# Patient Record
Sex: MALE | Race: WHITE | NOT HISPANIC OR LATINO | Employment: FULL TIME | URBAN - NONMETROPOLITAN AREA
[De-identification: names, ages, dates, MRNs, and addresses within clinical notes are randomized per-mention and may not be internally consistent; named-entity substitution may affect disease eponyms.]

---

## 2024-04-03 ENCOUNTER — OFFICE VISIT (OUTPATIENT)
Dept: ENDOCRINOLOGY | Facility: CLINIC | Age: 54
End: 2024-04-03
Payer: COMMERCIAL

## 2024-04-03 VITALS
SYSTOLIC BLOOD PRESSURE: 142 MMHG | DIASTOLIC BLOOD PRESSURE: 80 MMHG | HEART RATE: 88 BPM | HEIGHT: 69 IN | BODY MASS INDEX: 25.48 KG/M2 | WEIGHT: 172 LBS

## 2024-04-03 DIAGNOSIS — E11.8 TYPE 2 DIABETES MELLITUS WITH COMPLICATIONS: Primary | ICD-10-CM

## 2024-04-03 DIAGNOSIS — E53.8 VITAMIN B12 DEFICIENCY: ICD-10-CM

## 2024-04-03 DIAGNOSIS — E78.2 MIXED HYPERLIPIDEMIA: ICD-10-CM

## 2024-04-03 DIAGNOSIS — Z72.0 TOBACCO USE: ICD-10-CM

## 2024-04-03 DIAGNOSIS — R53.82 CHRONIC FATIGUE: ICD-10-CM

## 2024-04-03 DIAGNOSIS — I10 ESSENTIAL HYPERTENSION: ICD-10-CM

## 2024-04-03 PROBLEM — I25.10 CAD S/P PERCUTANEOUS CORONARY ANGIOPLASTY: Status: ACTIVE | Noted: 2024-04-03

## 2024-04-03 PROBLEM — Z98.61 CAD S/P PERCUTANEOUS CORONARY ANGIOPLASTY: Status: ACTIVE | Noted: 2024-04-03

## 2024-04-03 PROBLEM — E78.5 HYPERLIPIDEMIA: Status: ACTIVE | Noted: 2024-04-03

## 2024-04-03 RX ORDER — METFORMIN HYDROCHLORIDE 500 MG/1
1000 TABLET, EXTENDED RELEASE ORAL EVERY 12 HOURS SCHEDULED
Qty: 360 TABLET | Refills: 3 | Status: SHIPPED | OUTPATIENT
Start: 2024-04-03

## 2024-04-03 RX ORDER — METFORMIN HYDROCHLORIDE 500 MG/1
2 TABLET, EXTENDED RELEASE ORAL EVERY 12 HOURS SCHEDULED
COMMUNITY
Start: 2024-03-05 | End: 2024-04-03 | Stop reason: SDUPTHER

## 2024-04-03 RX ORDER — SITAGLIPTIN 50 MG/1
1 TABLET, FILM COATED ORAL DAILY
COMMUNITY
Start: 2024-02-25 | End: 2024-04-03

## 2024-04-03 RX ORDER — ALBUTEROL SULFATE 90 UG/1
2 AEROSOL, METERED RESPIRATORY (INHALATION) 4 TIMES DAILY PRN
COMMUNITY
Start: 2024-03-05

## 2024-04-03 RX ORDER — TICAGRELOR 90 MG/1
1 TABLET ORAL EVERY 12 HOURS SCHEDULED
COMMUNITY
Start: 2024-03-31

## 2024-04-03 RX ORDER — CARVEDILOL 3.12 MG/1
3.12 TABLET ORAL 2 TIMES DAILY WITH MEALS
COMMUNITY
Start: 2024-02-02

## 2024-04-03 RX ORDER — LOSARTAN POTASSIUM 50 MG/1
1 TABLET ORAL DAILY
COMMUNITY
Start: 2024-02-02

## 2024-04-03 RX ORDER — GLIPIZIDE 10 MG/1
10 TABLET ORAL
Qty: 180 TABLET | Refills: 3 | Status: SHIPPED | OUTPATIENT
Start: 2024-04-03

## 2024-04-03 RX ORDER — LANCETS
EACH MISCELLANEOUS
COMMUNITY
Start: 2024-03-05

## 2024-04-03 RX ORDER — ATORVASTATIN CALCIUM 80 MG/1
1 TABLET, FILM COATED ORAL DAILY
COMMUNITY
Start: 2024-02-26

## 2024-04-03 RX ORDER — DAPAGLIFLOZIN 5 MG/1
5 TABLET, FILM COATED ORAL DAILY
Qty: 90 TABLET | Refills: 3 | Status: SHIPPED | OUTPATIENT
Start: 2024-04-03

## 2024-04-03 RX ORDER — BLOOD SUGAR DIAGNOSTIC
1 STRIP MISCELLANEOUS AS NEEDED
COMMUNITY
Start: 2024-03-05

## 2024-04-03 RX ORDER — GLIPIZIDE 10 MG/1
10 TABLET ORAL
COMMUNITY
Start: 2024-02-02 | End: 2024-04-03 | Stop reason: SDUPTHER

## 2024-04-03 NOTE — ASSESSMENT & PLAN NOTE
Elevated in office today, reports controlled at home.   Rx: continue losartan, carvedilol.   Cautioned risk for hypotension with Farxiga use.

## 2024-04-03 NOTE — ASSESSMENT & PLAN NOTE
Diabetes is not controlled  A1C 10.6% (1/19/2024) - too soon to recheck A1C in office today  Orders for labs for recheck in 2 weeks for monitoring and baseline CBC, CMP, lipid, TSH.  Rx: stop Januvia given non-efficacious. Start Mounjaro 2.5 mg given hx CVD. Start Farxiga 5 mg QD given hx CVD. Continue metformin, glipizide. Medication list and instructions provided.   Advised to contact office after 3-4 weeks for dose increase with Mounjaro if tolerating well.     Due for eye exam - encouraged scheduling.  Due for microalbumin - ordered  Unknown LDL - ordered, continue atorvastatin 80 mg     Discussed diet: 3-4 carb servings per meal for females, 4-5 carb servings per meal for males  Increase lean protein and vegetable intake  Avoid concentrated sugar drinks, such as sodas, and processed carbs including crackers, cookies, cakes  Exercise: Recommend at least 30 minutes of exercise daily, at least 5 days per week. Increase exercise gradually and encouraged strength exercises 2-3 days per week.    Blood Glucose Goal: Blood glucose goal <150 fasting, <180 2 hr postprandial. Encouraged monitoring daily.   Discussed yearly microalbumin, annual eye examinations with Ophthalmology, dental hygiene, and foot care.  Discussed medications, side effects and compliance.  Discussed hypoglycemia management and prevention.   Reviewed ‘ABCs’ of diabetes management (respective goals in parentheses):  A1C (<7), blood pressure (<130/80), and cholesterol (LDL <100, if CVD <70). od pressure (<130/80), and cholesterol (LDL <100, if CVD <70).

## 2024-04-03 NOTE — ASSESSMENT & PLAN NOTE
Discussed risk for B12 deficiency with metformin use - reports hx low levels in past.  Recheck with labs.  Advised B12 1000 mcg supplement daily.

## 2024-04-03 NOTE — PROGRESS NOTES
Chief Complaint   Patient presents with    Diabetes        HPI  Vito Salvador is a 53 y.o. male presents today for evaluation of type 2 diabetes. Referring Provider: Coni Ponce MD. Without additional concerns.    Diagnosed with diabetes: 2003, previously well controlled   Complications/Co-morbidities: CAD s/p PCI (3/2021), HTN, HLD    Current regimen includes: Metformin 500 mg ER 2 tablets twice a day (2003), Januvia 50 mg (started 2023), glipizide 10 mg twice a day (2003). Has not tried other anti-diabetic medications.   Compliance with medications is good, tolerating well.   - taking losartan   - taking atorvastatin  - Aspirin: taking    - HbA1c: 10.6% (1/19/2024); reports previously well controlled until about 2021 after MI  - Checks FSBG 2-3 times per week; checking 2-3 hours after meal 120-200s   - Admits chronic fatigue, generalized weakness, Admits chronic SOB due to hx tobacco use. -Denies hypoglycemia. Denies chest pain, constipation, abdominal pain, increased thirst or urination. Denies vision changes. Denies numbness, open sores.   -Denies history of pancreatitis, alcohol abuse. Admits family history of diabetes: father, mother. Denies family hx thyroid disease, MENs.     Patient has not been making effort toward lifestyle management.   Patient is not interested in diabetes,nutrition, and exercise education.      Home: lives with wife, 3 grandchildren, 2 daughters  Occupation: [employer];    Diet: Meals: 2 meals/day Breakfast: none Lunch: ham sandwich, chips Dinner: meat + carb + vegetable  Snacks: peanut butter crackers Drinks: diet soda (10 cans/day)   Exercise: work  Sleep: no concerns   Stress: denies   Tobacco use: yes; 1-1.5 ppd   Alcohol use: denies     DM Health Maintenance:  Ophthalmology: 1.5 years ago; [facility] ; denies concerns in past   Monofilament / Foot exam: performed today   Urine microalbumin: unknown   LDL: unknown   TSH: unknown     Past Medical History:   Diagnosis  Date    CAD S/P percutaneous coronary angioplasty     Hyperlipidemia     Hypertension     Type 2 diabetes mellitus      History reviewed. No pertinent surgical history.   Family History   Problem Relation Age of Onset    Diabetes Mother     Diabetes Father       Social History     Socioeconomic History    Marital status:    Tobacco Use    Smoking status: Every Day     Types: Cigarettes   Vaping Use    Vaping status: Every Day    Substances: Nicotine   Substance and Sexual Activity    Alcohol use: Defer    Drug use: Never    Sexual activity: Defer      No Known Allergies   Current Outpatient Medications on File Prior to Visit   Medication Sig Dispense Refill    Accu-Chek Guide test strip 1 each by Other route As Needed.      Accu-Chek Softclix Lancets lancets USE TWICE DAILY TO TEST BLOOD SUGAR      albuterol sulfate  (90 Base) MCG/ACT inhaler Inhale 2 puffs 4 (Four) Times a Day As Needed.      atorvastatin (LIPITOR) 80 MG tablet Take 1 tablet by mouth Daily.      Brilinta 90 MG tablet tablet Take 1 tablet by mouth Every 12 (Twelve) Hours.      carvedilol (COREG) 3.125 MG tablet Take 1 tablet by mouth 2 (Two) Times a Day With Meals.      losartan (COZAAR) 50 MG tablet Take 1 tablet by mouth Daily.      [DISCONTINUED] glipizide (GLUCOTROL) 10 MG tablet Take 1 tablet by mouth 2 (Two) Times a Day Before Meals.      [DISCONTINUED] Januvia 50 MG tablet Take 1 tablet by mouth Daily.      [DISCONTINUED] metFORMIN ER (GLUCOPHAGE-XR) 500 MG 24 hr tablet Take 2 tablets by mouth Every 12 (Twelve) Hours.       No current facility-administered medications on file prior to visit.        Review of Systems   Constitutional:  Positive for fatigue. Negative for activity change, appetite change, unexpected weight gain and unexpected weight loss.   HENT:  Positive for rhinorrhea.    Eyes:  Negative for visual disturbance.   Respiratory:  Positive for cough and shortness of breath. Negative for wheezing.   "  Gastrointestinal:  Positive for GERD. Negative for abdominal pain, constipation and diarrhea.   Endocrine: Negative for cold intolerance, heat intolerance, polydipsia and polyuria.   Genitourinary:  Negative for dysuria.   Musculoskeletal:  Negative for arthralgias.   Skin:  Negative for rash and skin lesions.   Neurological:  Positive for weakness. Negative for dizziness and numbness.   Psychiatric/Behavioral:  Negative for sleep disturbance and stress. The patient is not nervous/anxious.         /80   Pulse 88   Ht 175.3 cm (69\")   Wt 78 kg (172 lb)   BMI 25.40 kg/m²      Physical Exam  Constitutional:       Appearance: Normal appearance.   Cardiovascular:      Rate and Rhythm: Normal rate and regular rhythm.      Pulses: Normal pulses.           Dorsalis pedis pulses are 2+ on the right side and 2+ on the left side.        Posterior tibial pulses are 2+ on the right side and 2+ on the left side.   Pulmonary:      Effort: Pulmonary effort is normal. No respiratory distress.      Breath sounds: No wheezing, rhonchi or rales.   Musculoskeletal:         General: Normal range of motion.      Right foot: No deformity.      Left foot: No deformity.   Feet:      Right foot:      Protective Sensation: 10 sites tested.  10 sites sensed.      Skin integrity: Skin integrity normal. No ulcer.      Toenail Condition: Fungal disease present.     Left foot:      Protective Sensation: 10 sites tested.  10 sites sensed.      Skin integrity: Skin integrity normal. No ulcer.      Toenail Condition: Fungal disease present.     Comments: Diabetic Foot Exam Performed and Monofilament Test Performed     Skin:     General: Skin is warm and dry.   Neurological:      General: No focal deficit present.      Mental Status: He is alert and oriented to person, place, and time.   Psychiatric:         Mood and Affect: Mood normal.         Behavior: Behavior normal.       LABS AND IMAGING  CMP:     Lipid Panel:  No results found for: " "\"CHOL\", \"TRIG\", \"HDL\", \"VLDL\", \"LDL\"  HbA1c:     Glucose:  No results found for: \"POCGLU\"  Microalbumin:  No results found for: \"MALBCRERATIO\"  TSH:  No results found for: \"TSH\"    Assessment and Plan    Diagnoses and all orders for this visit:    1. Type 2 diabetes mellitus with complications (Primary)  Assessment & Plan:  Diabetes is not controlled  A1C 10.6% (1/19/2024) - too soon to recheck A1C in office today  Orders for labs for recheck in 2 weeks for monitoring and baseline CBC, CMP, lipid, TSH.  Rx: stop Januvia given non-efficacious. Start Mounjaro 2.5 mg given hx CVD. Start Farxiga 5 mg QD given hx CVD. Continue metformin, glipizide. Medication list and instructions provided.   Advised to contact office after 3-4 weeks for dose increase with Mounjaro if tolerating well.     Due for eye exam - encouraged scheduling.  Due for microalbumin - ordered  Unknown LDL - ordered, continue atorvastatin 80 mg     Discussed diet: 3-4 carb servings per meal for females, 4-5 carb servings per meal for males  Increase lean protein and vegetable intake  Avoid concentrated sugar drinks, such as sodas, and processed carbs including crackers, cookies, cakes  Exercise: Recommend at least 30 minutes of exercise daily, at least 5 days per week. Increase exercise gradually and encouraged strength exercises 2-3 days per week.    Blood Glucose Goal: Blood glucose goal <150 fasting, <180 2 hr postprandial. Encouraged monitoring daily.   Discussed yearly microalbumin, annual eye examinations with Ophthalmology, dental hygiene, and foot care.  Discussed medications, side effects and compliance.  Discussed hypoglycemia management and prevention.   Reviewed ‘ABCs’ of diabetes management (respective goals in parentheses):  A1C (<7), blood pressure (<130/80), and cholesterol (LDL <100, if CVD <70). od pressure (<130/80), and cholesterol (LDL <100, if CVD <70).     Orders:  -     Microalbumin / Creatinine Urine Ratio - Urine, Clean Catch; " Future  -     Comprehensive Metabolic Panel; Future  -     CBC & Differential; Future  -     Hemoglobin A1c; Future  -     Vitamin B12; Future    2. Essential hypertension  Assessment & Plan:  Elevated in office today, reports controlled at home.   Rx: continue losartan, carvedilol.   Cautioned risk for hypotension with Farxiga use.         3. Mixed hyperlipidemia  Assessment & Plan:  Unknown control  Continue atorvastatin 80 mg    Orders:  -     Lipid Panel; Future    4. Vitamin B12 deficiency  Assessment & Plan:  Discussed risk for B12 deficiency with metformin use - reports hx low levels in past.  Recheck with labs.  Advised B12 1000 mcg supplement daily.     Orders:  -     Vitamin B12; Future    5. Chronic fatigue  Assessment & Plan:  Discussed likely multifactorial uncontrolled DM, tobacco use/COPD, anemia/b12 deficiency.  Ddx: thyroid disease, dehydration, GRETCHEN, among others  Advised labs for further evaluation   Advised follow-up with PCP consider further evaluation for COPD.       6. Tobacco use  Assessment & Plan:  Discussed contributor to insulin resistance and likely chronic fatigue.  Encouraged effort toward tobacco cessation.  Declines assistance with cessation.        Other orders  -     Tirzepatide (MOUNJARO) 2.5 MG/0.5ML solution pen-injector pen; Inject 0.5 mL under the skin into the appropriate area as directed 1 (One) Time Per Week.  Dispense: 2 mL; Refill: 3  -     metFORMIN ER (GLUCOPHAGE-XR) 500 MG 24 hr tablet; Take 2 tablets by mouth Every 12 (Twelve) Hours.  Dispense: 360 tablet; Refill: 3  -     glipizide (GLUCOTROL) 10 MG tablet; Take 1 tablet by mouth 2 (Two) Times a Day Before Meals.  Dispense: 180 tablet; Refill: 3  -     dapagliflozin (FARXIGA) 5 MG tablet tablet; Take 1 tablet by mouth Daily.  Dispense: 90 tablet; Refill: 3         Return in about 3 months (around 7/3/2024) for follow-up diabetes. The patient was instructed to contact the clinic with any interval questions or  concerns.    Rhonda Leach PA-C   Endocrinology    Please note that portions of this note were completed with a voice recognition program.

## 2024-04-03 NOTE — ASSESSMENT & PLAN NOTE
Discussed contributor to insulin resistance and likely chronic fatigue.  Encouraged effort toward tobacco cessation.  Declines assistance with cessation.

## 2024-04-03 NOTE — PATIENT INSTRUCTIONS
Diabetes Treatment Recommendations    24     Vito ISAAK [place] 1970     Your A1C is: 10.6% (2024)     ADA General Goals: A1c: < 7%                                                  Fasting/before meal glucose: <150 mg/dL                                    2 Hour after meal glucoses: < 180 mg/dL                                        Bedtime glucose:120-180                                                                Glucose testing frequency: 1-2 times daily; bring log/meter to follow-up visits    Medication Changes:   - STOP Januvia  - Start Mounjaro 2.5 mg injection: ONCE PER WEEK    May cause abdominal pain, nausea, vomiting, constipation. Contact office if concerns with use.   - Start Farxiga 5 m tablet once daily   May cause yeast infection or UTI - contact office if burning, itching, or increased urination.   May cause low blood pressure.   - Continue metformin 500 mg ER: 2 tablets twice daily   - Continue glipizide 10 m tablet twice daily   May cause low blood sugar. Contact office if concerns for recurrent hypoglycemia.     Nutritional Recommendations:   3-4 carb portions per meal (45-60 gm of carbs) female  4-5 carb portions per meal for male (60-75 gm of carbs)    Physical Activity Goals:  Walk at least 15 min every day.  Ideally, exercise 45-60 min most days (could be done in short bouts of 10-15 minutes).    Keep records of your glucose levels and insulin adjustments. We may ask you to keep records on the content of your meals with insulin doses and before/after meal glucose levels to evaluate your ratios.    Call for advice if you have unexplained or unexpected hypoglycemia  (glucose < 60) or persistent high glucose > 300.    Office: [phone]    Rhonda Leach PA-C

## 2024-04-03 NOTE — ASSESSMENT & PLAN NOTE
Discussed likely multifactorial uncontrolled DM, tobacco use/COPD, anemia/b12 deficiency.  Ddx: thyroid disease, dehydration, GRETCHEN, among others  Advised labs for further evaluation   Advised follow-up with PCP consider further evaluation for COPD.

## 2024-04-23 LAB
ALBUMIN SERPL-MCNC: 4.6 G/DL (ref 3.8–4.9)
ALBUMIN/CREAT UR: 4 MG/G CREAT (ref 0–29)
ALBUMIN/GLOB SERPL: 2.3 {RATIO} (ref 1.2–2.2)
ALP SERPL-CCNC: 70 IU/L (ref 44–121)
ALT SERPL-CCNC: 16 IU/L (ref 0–44)
AST SERPL-CCNC: 18 IU/L (ref 0–40)
BASOPHILS # BLD AUTO: 0.1 X10E3/UL (ref 0–0.2)
BASOPHILS NFR BLD AUTO: 1 %
BILIRUB SERPL-MCNC: 0.2 MG/DL (ref 0–1.2)
BUN SERPL-MCNC: 10 MG/DL (ref 6–24)
BUN/CREAT SERPL: 12 (ref 9–20)
CALCIUM SERPL-MCNC: 9.2 MG/DL (ref 8.7–10.2)
CHLORIDE SERPL-SCNC: 105 MMOL/L (ref 96–106)
CHOLEST SERPL-MCNC: 168 MG/DL (ref 100–199)
CO2 SERPL-SCNC: 21 MMOL/L (ref 20–29)
CREAT SERPL-MCNC: 0.84 MG/DL (ref 0.76–1.27)
CREAT UR-MCNC: 74.9 MG/DL
EGFRCR SERPLBLD CKD-EPI 2021: 104 ML/MIN/1.73
EOSINOPHIL # BLD AUTO: 0.4 X10E3/UL (ref 0–0.4)
EOSINOPHIL NFR BLD AUTO: 5 %
ERYTHROCYTE [DISTWIDTH] IN BLOOD BY AUTOMATED COUNT: 12.4 % (ref 11.6–15.4)
GLOBULIN SER CALC-MCNC: 2 G/DL (ref 1.5–4.5)
GLUCOSE SERPL-MCNC: 170 MG/DL (ref 70–99)
HBA1C MFR BLD: 9.6 % (ref 4.8–5.6)
HCT VFR BLD AUTO: 48.9 % (ref 37.5–51)
HDLC SERPL-MCNC: 34 MG/DL
HGB BLD-MCNC: 16.6 G/DL (ref 13–17.7)
IMM GRANULOCYTES # BLD AUTO: 0 X10E3/UL (ref 0–0.1)
IMM GRANULOCYTES NFR BLD AUTO: 0 %
LDLC SERPL CALC-MCNC: 110 MG/DL (ref 0–99)
LYMPHOCYTES # BLD AUTO: 1.7 X10E3/UL (ref 0.7–3.1)
LYMPHOCYTES NFR BLD AUTO: 21 %
MCH RBC QN AUTO: 31.1 PG (ref 26.6–33)
MCHC RBC AUTO-ENTMCNC: 33.9 G/DL (ref 31.5–35.7)
MCV RBC AUTO: 92 FL (ref 79–97)
MICROALBUMIN UR-MCNC: 3.1 UG/ML
MONOCYTES # BLD AUTO: 0.4 X10E3/UL (ref 0.1–0.9)
MONOCYTES NFR BLD AUTO: 5 %
NEUTROPHILS # BLD AUTO: 5.5 X10E3/UL (ref 1.4–7)
NEUTROPHILS NFR BLD AUTO: 68 %
PLATELET # BLD AUTO: 225 X10E3/UL (ref 150–450)
POTASSIUM SERPL-SCNC: 4.8 MMOL/L (ref 3.5–5.2)
PROT SERPL-MCNC: 6.6 G/DL (ref 6–8.5)
RBC # BLD AUTO: 5.33 X10E6/UL (ref 4.14–5.8)
SODIUM SERPL-SCNC: 140 MMOL/L (ref 134–144)
TRIGL SERPL-MCNC: 132 MG/DL (ref 0–149)
VIT B12 SERPL-MCNC: >2000 PG/ML (ref 232–1245)
VLDLC SERPL CALC-MCNC: 24 MG/DL (ref 5–40)
WBC # BLD AUTO: 8.1 X10E3/UL (ref 3.4–10.8)

## 2024-04-24 ENCOUNTER — TELEPHONE (OUTPATIENT)
Dept: ENDOCRINOLOGY | Facility: CLINIC | Age: 54
End: 2024-04-24
Payer: COMMERCIAL

## 2024-04-24 NOTE — TELEPHONE ENCOUNTER
Spoke with patient in regard to recent labs with mild improvement in A1C 9.6%. Patient reports he is tolerating Mounjaro and Farxiga well. He has noticed significant improvement in blood sugars, but has noted 2 episodes of low blood sugar in evening and overnight in the 50s. Advised to decrease glipizide by 1/2 (10 mg daily) and only take with meal. Advised to continue to monitor and notify the office if still experiencing lows. Discussed , slightly above goal - he reports out of atorvastatin 80 mg x 1 week- continue to restart this. Discussed may have additional improvement with starting mounjaro/Farxiga - will continue to monitor for now. Discussed B12 elevated - can reduce dose 500 mcg daily or every other day. Other labs without significant abnormality. Patient agreeable to plan. Advised contact office if additional questions or concerns.

## 2024-06-05 RX ORDER — METFORMIN HYDROCHLORIDE 500 MG/1
1000 TABLET, EXTENDED RELEASE ORAL EVERY 12 HOURS SCHEDULED
Qty: 360 TABLET | Refills: 2 | Status: SHIPPED | OUTPATIENT
Start: 2024-06-05

## 2024-06-05 NOTE — TELEPHONE ENCOUNTER
Caller: Vito Salvador    Relationship: Self    Best call back number: [phone]     Requested Prescriptions:   Requested Prescriptions      No prescriptions requested or ordered in this encounter    METFORMIN 90 DAY SUPPLY    Pharmacy where request should be sent:    [address]  Last office visit with prescribing clinician: 4/3/2024     Next office visit with prescribing clinician: 7/3/2024     Does the patient have less than a 3 day supply:  [x] Yes  [] No    Would you like a call back once the refill request has been completed: [x] Yes [] No    If the office needs to give you a call back, can they leave a voicemail: [x] Yes [] No    Jodi Luna Rep   06/05/24 10:28 EDT

## 2024-07-03 ENCOUNTER — OFFICE VISIT (OUTPATIENT)
Dept: ENDOCRINOLOGY | Facility: CLINIC | Age: 54
End: 2024-07-03
Payer: COMMERCIAL

## 2024-07-03 VITALS
SYSTOLIC BLOOD PRESSURE: 122 MMHG | WEIGHT: 158.6 LBS | BODY MASS INDEX: 23.49 KG/M2 | HEIGHT: 69 IN | HEART RATE: 85 BPM | DIASTOLIC BLOOD PRESSURE: 78 MMHG | OXYGEN SATURATION: 96 %

## 2024-07-03 DIAGNOSIS — E11.8 TYPE 2 DIABETES MELLITUS WITH COMPLICATIONS: Primary | ICD-10-CM

## 2024-07-03 RX ORDER — DAPAGLIFLOZIN 5 MG/1
5 TABLET, FILM COATED ORAL DAILY
Qty: 90 TABLET | Refills: 3 | Status: SHIPPED | OUTPATIENT
Start: 2024-07-03

## 2024-07-03 RX ORDER — GLIPIZIDE 5 MG/1
5 TABLET ORAL
Qty: 180 TABLET | Refills: 1 | Status: SHIPPED | OUTPATIENT
Start: 2024-07-03

## 2024-07-03 RX ORDER — METFORMIN HYDROCHLORIDE 500 MG/1
1000 TABLET, EXTENDED RELEASE ORAL EVERY 12 HOURS SCHEDULED
Qty: 360 TABLET | Refills: 2 | Status: SHIPPED | OUTPATIENT
Start: 2024-07-03

## 2024-07-03 RX ORDER — BLOOD SUGAR DIAGNOSTIC
1 STRIP MISCELLANEOUS DAILY
Qty: 100 EACH | Refills: 5 | Status: SHIPPED | OUTPATIENT
Start: 2024-07-03

## 2024-07-03 NOTE — PROGRESS NOTES
Chief Complaint   Patient presents with    Diabetes      HPI  Vito Salvador is a 53 y.o. male presents today for follow-up evaluation of type 2 diabetes. They were last seen for this 4/2/2024, last A1C 9.6% 4/22/2024. Started Mounjaro 2.5 mg, farxiga 5 mg and continued metformin, glipizide.     Reports improved fatigue and weakness since last visit. Admits chronic SOB due to hx tobacco use. Denies hypoglycemia. Denies chest pain, constipation, abdominal pain, increased thirst or urination. Denies vision changes. Denies numbness, open sores.     Type 2 diabetes:   Diagnosed with diabetes: 2003, previously well controlled   Complications/Co-morbidities: CAD s/p PCI (3/2021), HTN, HLD    - HbA1c: 10.6% (1/19/2024); reports previously well controlled until about 2021 after MI  - Checks FSBG 2-3 times per week; checking 2-3 hours after meal 90-150s. Has not had readings in 200s since last visit.     Current regimen includes: Mounjaro 2.5 mg weekly, Farxiga 5 mg, Metformin 500 mg ER 2 tablets twice a day (2003), glipizide 10 mg twice a day (2003).   Previous medications: Januvia (stopped 4/2024, non-efficacious)    Compliance with medications is good, tolerating well.   - taking losartan   - taking atorvastatin  - Aspirin: taking    Denies history of pancreatitis, alcohol abuse. Admits family history of diabetes: father, mother. Denies family hx thyroid disease, MENs.     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 No pertinent surgical history.   Family History   Problem Relation Age of Onset    Diabetes Mother     Diabetes Father       Social History     Socioeconomic History    Marital status:    Tobacco Use    Smoking status: Every Day     Current packs/day: 1.00     Average packs/day: 1 pack/day for 30.0 years (30.0 ttl pk-yrs)     Types: Cigarettes     Start date: 7/3/1994    Smokeless tobacco: Never   Vaping Use    Vaping status: Never Used   Substance and Sexual Activity    Alcohol use: Defer    Drug use: Never    Sexual activity: Defer      Allergies   Allergen Reactions    Penicillins Rash      Current Outpatient Medications on File Prior to Visit   Medication Sig Dispense Refill    Accu-Chek Softclix Lancets lancets USE TWICE DAILY TO TEST BLOOD SUGAR      albuterol sulfate  (90 Base) MCG/ACT inhaler Inhale 2 puffs 4 (Four) Times a Day As Needed.      atorvastatin (LIPITOR) 80 MG tablet Take 1 tablet by mouth Daily.      Brilinta 90 MG tablet tablet Take 1 tablet by mouth Every 12 (Twelve) Hours.      carvedilol (COREG) 3.125 MG tablet Take 1 tablet by mouth 2 (Two) Times a Day With Meals.      losartan (COZAAR) 50 MG tablet Take 1 tablet by mouth Daily.      [DISCONTINUED] Accu-Chek Guide test strip 1 each by Other route As Needed.      [DISCONTINUED] dapagliflozin (FARXIGA) 5 MG tablet tablet Take 1 tablet by mouth Daily. 90 tablet 3    [DISCONTINUED] glipizide (GLUCOTROL) 10 MG tablet Take 1 tablet by mouth 2 (Two) Times a Day Before Meals. 180 tablet 3    [DISCONTINUED] metFORMIN ER (GLUCOPHAGE-XR) 500 MG 24 hr tablet Take 2 tablets by mouth Every 12 (Twelve) Hours. 360 tablet 2    [DISCONTINUED] Tirzepatide (MOUNJARO) 2.5 MG/0.5ML solution pen-injector pen Inject 0.5 mL under the skin into the appropriate area  "as directed 1 (One) Time Per Week. 2 mL 3     No current facility-administered medications on file prior to visit.        Review of Systems   Constitutional:  Negative for activity change, appetite change, unexpected weight gain and unexpected weight loss.   Eyes:  Negative for visual disturbance.   Respiratory:  Negative for shortness of breath.    Cardiovascular:  Negative for chest pain.   Gastrointestinal:  Negative for abdominal pain, constipation and diarrhea.   Endocrine: Negative for polydipsia and polyuria.   Skin:  Negative for rash and skin lesions.   Neurological:  Negative for dizziness and numbness.        /78 (BP Location: Left arm, Patient Position: Sitting, Cuff Size: Adult)   Pulse 85   Ht 175.3 cm (69\")   Wt 71.9 kg (158 lb 9.6 oz)   SpO2 96%   BMI 23.42 kg/m²      Physical Exam  Constitutional:       Appearance: Normal appearance.   Cardiovascular:      Rate and Rhythm: Normal rate.   Pulmonary:      Effort: Pulmonary effort is normal.   Musculoskeletal:         General: Normal range of motion.   Skin:     General: Skin is warm and dry.   Neurological:      General: No focal deficit present.      Mental Status: He is alert and oriented to person, place, and time.   Psychiatric:         Mood and Affect: Mood normal.         Behavior: Behavior normal.         LABS AND IMAGING  CMP:  Lab Results   Component Value Date    BUN 10 04/22/2024    CREATININE 0.84 04/22/2024    BCR 12 04/22/2024     04/22/2024    K 4.8 04/22/2024    CO2 21 04/22/2024    CALCIUM 9.2 04/22/2024    ALBUMIN 4.6 04/22/2024    BILITOT 0.2 04/22/2024    ALKPHOS 70 04/22/2024    AST 18 04/22/2024    ALT 16 04/22/2024     Lipid Panel:  Lab Results   Component Value Date    TRIG 132 04/22/2024    HDL 34 (L) 04/22/2024    VLDL 24 04/22/2024     (H) 04/22/2024     HbA1c:  Lab Results   Component Value Date    HGBA1C 9.6 (H) 04/22/2024     Glucose:  No results found for: \"POCGLU\"  Microalbumin:  Lab Results "   Component Value Date    FLORES 4 04/22/2024       Assessment and Plan    Diagnoses and all orders for this visit:    1. Type 2 diabetes mellitus with complications (Primary)  Assessment & Plan:  Diabetes is improving based on home BG: low 100s.  A1C 9.6% 4/22/2024 - too soon to recheck A1C in office today    Rx: Decrease glipizide 5 mg. Continue Mounjaro 2.5 mg, Farxiga 5 mg QD, metformin 1000 mg BID.      Due for eye exam - encouraged scheduling.  Microalbumin up to date.   Foot exam up to date.  LDL slightly elevated, but recently started Mounjaro/Farxiga with weight loss with possible improvement - continue atorvastatin 80 mg for now; consider addition Zetia if continues elevated next OV.     Orders:  -     Accu-Chek Guide test strip; 1 each by Other route Daily.  Dispense: 100 each; Refill: 5  -     glipizide (GLUCOTROL) 5 MG tablet; Take 1 tablet by mouth 2 (Two) Times a Day Before Meals.  Dispense: 180 tablet; Refill: 1  -     Tirzepatide (MOUNJARO) 2.5 MG/0.5ML solution pen-injector pen; Inject 0.5 mL under the skin into the appropriate area as directed 1 (One) Time Per Week.  Dispense: 2 mL; Refill: 5  -     dapagliflozin (FARXIGA) 5 MG tablet tablet; Take 1 tablet by mouth Daily.  Dispense: 90 tablet; Refill: 3  -     metFORMIN ER (GLUCOPHAGE-XR) 500 MG 24 hr tablet; Take 2 tablets by mouth Every 12 (Twelve) Hours.  Dispense: 360 tablet; Refill: 2  -     Hemoglobin A1c; Future         Return in about 4 months (around 11/3/2024) for follow-up diabetes. The patient was instructed to contact the clinic with any interval questions or concerns.    Rhonda Leach PA-C   Endocrinology    Please note that portions of this note were completed with a voice recognition program.

## 2024-07-03 NOTE — ASSESSMENT & PLAN NOTE
Diabetes is improving based on home BG: low 100s.  A1C 9.6% 4/22/2024 - too soon to recheck A1C in office today    Rx: Decrease glipizide 5 mg. Continue Mounjaro 2.5 mg, Farxiga 5 mg QD, metformin 1000 mg BID.      Due for eye exam - encouraged scheduling.  Microalbumin up to date.   Foot exam up to date.  LDL slightly elevated, but recently started Mounjaro/Farxiga with weight loss with possible improvement - continue atorvastatin 80 mg for now; consider addition Zetia if continues elevated next OV.

## 2024-07-18 ENCOUNTER — TELEPHONE (OUTPATIENT)
Dept: ENDOCRINOLOGY | Facility: CLINIC | Age: 54
End: 2024-07-18
Payer: COMMERCIAL

## 2024-07-18 NOTE — TELEPHONE ENCOUNTER
PT CALLED ABOUT HAVING LABS DRAWN NEXT WEEK. HE WANTED TO KNOW IF WYATT MURRELL WANTS ANY ADDITIONAL LABS DRAWN. HE WILL CONTACT US ON LATER DATE WITH [facility] FAX #

## 2024-07-24 RX ORDER — ATORVASTATIN CALCIUM 80 MG/1
80 TABLET, FILM COATED ORAL DAILY
Qty: 90 TABLET | Status: CANCELLED | OUTPATIENT
Start: 2024-07-24

## 2024-07-25 LAB — HBA1C MFR BLD: 6.5 % (ref 4.8–5.6)

## 2024-07-25 RX ORDER — ATORVASTATIN CALCIUM 80 MG/1
80 TABLET, FILM COATED ORAL NIGHTLY
Qty: 90 TABLET | Refills: 0 | Status: SHIPPED | OUTPATIENT
Start: 2024-07-25

## 2024-07-25 NOTE — TELEPHONE ENCOUNTER
Rx Refill Note  Requested Prescriptions     Pending Prescriptions Disp Refills    atorvastatin (LIPITOR) 80 MG tablet [Pharmacy Med Name: ATORVASTATIN 80MG TABLETS] 90 tablet      Sig: TAKE 1 TABLET BY MOUTH EVERY NIGHT      Last office visit with prescribing clinician: 7/3/2024     Next office visit with prescribing clinician: 12/4/2024     Kath Sprague MA  07/25/24, 08:07 EDT

## 2024-08-19 ENCOUNTER — TELEPHONE (OUTPATIENT)
Dept: ENDOCRINOLOGY | Facility: CLINIC | Age: 54
End: 2024-08-19
Payer: COMMERCIAL

## 2024-08-19 RX ORDER — TICAGRELOR 90 MG/1
90 TABLET ORAL 2 TIMES DAILY
Qty: 180 TABLET | OUTPATIENT
Start: 2024-08-19

## 2024-08-19 NOTE — TELEPHONE ENCOUNTER
Caller: Vito Salvador    Relationship to patient: Self    Best call back number: [phone]     Patient is needing: PATIENT STATES HE RECEIVED A CALL FROM THE [place] OFFICE ASKING HIM TO CALL THEM BACK AT THEIR OFFICE. HUB HAS NOT WAY TO TRANSFER AND WAS ADVISED BY [place] OFFICE THEY DO NOT EITHER.     PATIENT STATES THE CALL IS REGARDING HIS Brilinta 90 MG tablet tablet [[id number]] (Order [id number]).      PLEASE CALL PATIENT.

## 2024-08-19 NOTE — TELEPHONE ENCOUNTER
Pt returned call and wanted to know about filling Brinlinta.    I told him he had to call his PCP.    He verbalized understanding.

## 2024-10-21 RX ORDER — ATORVASTATIN CALCIUM 80 MG/1
80 TABLET, FILM COATED ORAL NIGHTLY
Qty: 90 TABLET | Refills: 0 | Status: SHIPPED | OUTPATIENT
Start: 2024-10-21

## 2024-12-04 ENCOUNTER — OFFICE VISIT (OUTPATIENT)
Dept: ENDOCRINOLOGY | Facility: CLINIC | Age: 54
End: 2024-12-04
Payer: COMMERCIAL

## 2024-12-04 VITALS
HEIGHT: 69 IN | DIASTOLIC BLOOD PRESSURE: 76 MMHG | WEIGHT: 156.6 LBS | HEART RATE: 86 BPM | SYSTOLIC BLOOD PRESSURE: 120 MMHG | OXYGEN SATURATION: 97 % | BODY MASS INDEX: 23.19 KG/M2

## 2024-12-04 DIAGNOSIS — E11.8 TYPE 2 DIABETES MELLITUS WITH COMPLICATIONS: Primary | ICD-10-CM

## 2024-12-04 LAB
EXPIRATION DATE: ABNORMAL
HBA1C MFR BLD: 6.6 % (ref 4.5–5.7)
Lab: ABNORMAL

## 2024-12-04 RX ORDER — METFORMIN HYDROCHLORIDE 500 MG/1
1000 TABLET, EXTENDED RELEASE ORAL EVERY 12 HOURS SCHEDULED
Qty: 360 TABLET | Refills: 2 | Status: SHIPPED | OUTPATIENT
Start: 2024-12-04

## 2024-12-04 RX ORDER — TIRZEPATIDE 2.5 MG/.5ML
2.5 INJECTION, SOLUTION SUBCUTANEOUS WEEKLY
Qty: 2 ML | Refills: 5 | Status: SHIPPED | OUTPATIENT
Start: 2024-12-04

## 2024-12-04 RX ORDER — GLIPIZIDE 2.5 MG/1
2.5 TABLET ORAL
Qty: 180 TABLET | Refills: 1 | Status: SHIPPED | OUTPATIENT
Start: 2024-12-04

## 2024-12-04 RX ORDER — DAPAGLIFLOZIN 10 MG/1
10 TABLET, FILM COATED ORAL DAILY
Qty: 90 TABLET | Refills: 1 | Status: SHIPPED | OUTPATIENT
Start: 2024-12-04

## 2024-12-04 NOTE — ASSESSMENT & PLAN NOTE
Diabetes is controlled  A1C 6.6% today.    Discussed continued taper glipizide and increase GLP1a or SGLT2i given CAD; agreeable to increase Farxiga 10 mg  Rx: Decrease glipizide 2.5 mg as needed if BG >140 before dinner and 180 fasting. Increase farxiga 10 mg. Continue Mounjaro 2.5 mg, metformin 1000 mg ER BID. Cautioned risk for hypoglycemia with glipizide use; advised glucose supplement as needed.     Due for eye exam - encouraged scheduling.  Microalbumin up to date, due 4/2025  Foot exam up to date, due 4/2025   LDL slightly elevated, continue atorvastatin 80 mg for now; consider addition Zetia if continues elevated, repeat 4/2025   BP at goal, continue management with PCP/cardiology

## 2024-12-04 NOTE — PATIENT INSTRUCTIONS
Diabetes Treatment Recommendations  24     Vito Salvador 1970     Your A1C is:   Lab Results   Component Value Date    HGBA1C 6.6 (A) 2024    HGBA1C 6.5 (H) 2024    HGBA1C 9.6 (H) 2024       ADA General Goals: A1c: < 7%                                                  Fasting/before meal glucose: <150 mg/dL                                    2 Hour after meal glucoses: < 180 mg/dL                                        Bedtime glucose:120-180                                                                Medication Changes:   - Decrease glipizide 2.5 m tablet as needed if blood sugar is more than 140 before dinner, more than 180 fasting in morning.   - Increase Farxiga 10 m tablet once daily   - Continue Mounjaro 2.5 mg weekly  - Continue metformin 500 m tablets twice daily.              Keep records of your glucose levels and insulin adjustments. We may ask you to keep records on the content of your meals with insulin doses and before/after meal glucose levels to evaluate your ratios.    Call for advice if you have unexplained or unexpected hypoglycemia  (glucose < 60) or persistent high glucose > 300.  Office: [phone]    Rhonda Leach PA-C

## 2024-12-04 NOTE — PROGRESS NOTES
Chief Complaint   Patient presents with    Diabetes          HPI  Vito Salvador is a 54 y.o. male presents today for follow-up evaluation of type 2 diabetes. They were last seen for this 7/3/2024. A1C 6.5%. Advised decrease glipizide 5 mg. Continue Mounjaro 2.5 mg, Farxiga 5 mg QD, metformin 1000 mg BID.     Taking medications as prescribed and tolerating well.  Reports snack before bedtime to prevent hypoglycemia.  Reports rare hypoglycemia, 1 since last visit.  Admits chronic SOB attributes to hx tobacco use, denies chest pain.   Denies N/V, appetite loss, constipation, abdominal pain.  Denies increased thirst or urination.   Denies vision changes.   Denies numbness, open sores.      Type 2 diabetes:   Diagnosed with diabetes: 2003, previously well controlled   Complications/Co-morbidities: CAD s/p PCI (3/2021), HTN, HLD     Current regimen includes: Mounjaro 2.5 mg weekly, Farxiga 5 mg, Metformin 500 mg ER 2 tablets twice a day, glipizide 5 mg twice a day  Previous medications: Januvia (stopped 4/2024, non-efficacious)    Compliance with medications is good, tolerating well.   - taking losartan   - taking atorvastatin  - Aspirin: taking     Denies history of pancreatitis, alcohol abuse. Admits family history of diabetes: father, mother. Denies family hx thyroid disease, MENs.      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 No pertinent surgical history.   Family History   Problem Relation Age of Onset    Diabetes Mother     Diabetes Father       Social History     Socioeconomic History    Marital status:    Tobacco Use    Smoking status: Every Day     Current packs/day: 1.00     Average packs/day: 1 pack/day for 30.4 years (30.4 ttl pk-yrs)     Types: Cigarettes     Start date: 7/3/1994    Smokeless tobacco: Never   Vaping Use    Vaping status: Never Used   Substance and Sexual Activity    Alcohol use: Defer    Drug use: Never    Sexual activity: Defer      Allergies   Allergen Reactions    Penicillins Rash      Current Outpatient Medications on File Prior to Visit   Medication Sig Dispense Refill    Accu-Chek Guide test strip 1 each by Other route Daily. 100 each 5    Accu-Chek Softclix Lancets lancets USE TWICE DAILY TO TEST BLOOD SUGAR      albuterol sulfate  (90 Base) MCG/ACT inhaler Inhale 2 puffs 4 (Four) Times a Day As Needed.      atorvastatin (LIPITOR) 80 MG tablet TAKE 1 TABLET BY MOUTH EVERY NIGHT 90 tablet 0    Brilinta 90 MG tablet tablet Take 1 tablet by mouth Every 12 (Twelve) Hours.      carvedilol (COREG) 3.125 MG tablet Take 1 tablet by mouth 2 (Two) Times a Day With Meals.      losartan (COZAAR) 50 MG tablet Take 1 tablet by mouth Daily.      [DISCONTINUED] dapagliflozin (FARXIGA) 5 MG tablet tablet Take 1 tablet by mouth Daily. 90 tablet 3    [DISCONTINUED] glipizide (GLUCOTROL) 5 MG tablet Take 1 tablet by mouth 2 (Two) Times a Day Before Meals. 180 tablet 1    [DISCONTINUED] metFORMIN ER (GLUCOPHAGE-XR) 500 MG 24 hr tablet Take 2 tablets by mouth Every 12 (Twelve) Hours. 360 tablet 2    [DISCONTINUED] Tirzepatide (MOUNJARO) 2.5 MG/0.5ML solution pen-injector pen Inject 0.5 mL under the skin into the appropriate area as directed 1 (One) Time Per Week. 2 mL 5     No current facility-administered medications on file prior to visit.     "    Review of Systems   Constitutional:  Negative for activity change, appetite change, unexpected weight gain and unexpected weight loss.   Eyes:  Negative for visual disturbance.   Respiratory:  Negative for shortness of breath.    Cardiovascular:  Negative for chest pain.   Gastrointestinal:  Negative for abdominal pain, constipation and diarrhea.   Endocrine: Negative for polydipsia and polyuria.   Skin:  Negative for rash and skin lesions.   Neurological:  Negative for numbness.        The following portions of the patient's history were reviewed and updated as appropriate: allergies, current medications, past family history, past medical history, past social history, past surgical history and problem list.       /76 (BP Location: Right arm, Patient Position: Sitting, Cuff Size: Adult)   Pulse 86   Ht 175.3 cm (69\")   Wt 71 kg (156 lb 9.6 oz)   SpO2 97%   BMI 23.13 kg/m²      Physical Exam  Constitutional:       Appearance: Normal appearance.   Cardiovascular:      Rate and Rhythm: Normal rate.   Pulmonary:      Effort: Pulmonary effort is normal.   Musculoskeletal:         General: Normal range of motion.   Skin:     General: Skin is warm and dry.   Neurological:      General: No focal deficit present.      Mental Status: He is alert and oriented to person, place, and time.   Psychiatric:         Mood and Affect: Mood normal.         Behavior: Behavior normal.         LABS AND IMAGING  CMP:  Lab Results   Component Value Date    Glucose 170 (H) 04/22/2024    BUN 10 04/22/2024    BUN/Creatinine Ratio 12 04/22/2024    Creatinine 0.84 04/22/2024    Creatinine, Urine 74.9 04/22/2024    EGFR Result 104 04/22/2024    Total CO2 21 04/22/2024    Calcium 9.2 04/22/2024    Albumin 4.6 04/22/2024    AST (SGOT) 18 04/22/2024    ALT (SGPT) 16 04/22/2024     Lipid Panel:  Lab Results   Component Value Date    TRIG 132 04/22/2024    HDL 34 (L) 04/22/2024    VLDL 24 04/22/2024     (H) 04/22/2024 " "    HbA1c:  Lab Results   Component Value Date    Hemoglobin A1C 6.6 (A) 12/04/2024    Hemoglobin A1C 6.5 (H) 07/24/2024    Hemoglobin A1C 9.6 (H) 04/22/2024     Glucose:  No results found for: \"POCGLU\"  Microalbumin:  Lab Results   Component Value Date    FLORES 4 04/22/2024     TSH:  No results found for: \"TSH\"    Assessment and Plan    Diagnoses and all orders for this visit:    1. Type 2 diabetes mellitus with complications (Primary)  Assessment & Plan:  Diabetes is controlled  A1C 6.6% today.    Discussed continued taper glipizide and increase GLP1a or SGLT2i given CAD; agreeable to increase Farxiga 10 mg  Rx: Decrease glipizide 2.5 mg as needed if BG >140 before dinner and 180 fasting. Increase farxiga 10 mg. Continue Mounjaro 2.5 mg, metformin 1000 mg ER BID. Cautioned risk for hypoglycemia with glipizide use; advised glucose supplement as needed.     Due for eye exam - encouraged scheduling.  Microalbumin up to date, due 4/2025  Foot exam up to date, due 4/2025   LDL slightly elevated, continue atorvastatin 80 mg for now; consider addition Zetia if continues elevated, repeat 4/2025   BP at goal, continue management with PCP/cardiology    Orders:  -     POC Glycosylated Hemoglobin (Hb A1C)  -     glipizide 2.5 MG tablet; Take 2.5 mg by mouth 2 (Two) Times a Day Before Meals. As needed if blood sugar is more than 140 before dinner, more than 180 fasting in morning.  Dispense: 180 tablet; Refill: 1  -     metFORMIN ER (GLUCOPHAGE-XR) 500 MG 24 hr tablet; Take 2 tablets by mouth Every 12 (Twelve) Hours.  Dispense: 360 tablet; Refill: 2    Other orders  -     Tirzepatide (Mounjaro) 2.5 MG/0.5ML solution auto-injector; Inject 2.5 mg under the skin into the appropriate area as directed 1 (One) Time Per Week.  Dispense: 2 mL; Refill: 5  -     dapagliflozin Propanediol (Farxiga) 10 MG tablet; Take 10 mg by mouth Daily.  Dispense: 90 tablet; Refill: 1         Return in about 6 months (around 6/4/2025) for " follow-up diabetes. The patient was instructed to contact the clinic with any interval questions or concerns.    Electronically signed by: Rhonda Leach PA-C   Endocrinology     Please note that portions of this note were completed with a voice recognition program.

## 2025-01-17 RX ORDER — ATORVASTATIN CALCIUM 80 MG/1
80 TABLET, FILM COATED ORAL NIGHTLY
Qty: 90 TABLET | Refills: 1 | Status: SHIPPED | OUTPATIENT
Start: 2025-01-17

## 2025-01-17 NOTE — TELEPHONE ENCOUNTER
Rx Refill Note  Requested Prescriptions     Pending Prescriptions Disp Refills    atorvastatin (LIPITOR) 80 MG tablet [Pharmacy Med Name: ATORVASTATIN 80MG TABLETS] 90 tablet 1     Sig: TAKE 1 TABLET BY MOUTH EVERY NIGHT          Last office visit with prescribing clinician: 12/04/2024    Next office visit with prescribing clinician: 06/04/2025  }    Gulshan Bose MA  01/17/25, 09:53 EST

## 2025-06-04 ENCOUNTER — OFFICE VISIT (OUTPATIENT)
Dept: ENDOCRINOLOGY | Facility: CLINIC | Age: 55
End: 2025-06-04
Payer: COMMERCIAL

## 2025-06-04 VITALS
BODY MASS INDEX: 21.3 KG/M2 | WEIGHT: 143.8 LBS | OXYGEN SATURATION: 100 % | HEIGHT: 69 IN | DIASTOLIC BLOOD PRESSURE: 74 MMHG | SYSTOLIC BLOOD PRESSURE: 118 MMHG | HEART RATE: 96 BPM

## 2025-06-04 DIAGNOSIS — E78.2 MIXED HYPERLIPIDEMIA: ICD-10-CM

## 2025-06-04 DIAGNOSIS — Z72.0 TOBACCO USE: ICD-10-CM

## 2025-06-04 DIAGNOSIS — E11.8 TYPE 2 DIABETES MELLITUS WITH COMPLICATIONS: Primary | ICD-10-CM

## 2025-06-04 LAB
EXPIRATION DATE: ABNORMAL
EXPIRATION DATE: ABNORMAL
GLUCOSE BLDC GLUCOMTR-MCNC: 140 MG/DL (ref 70–130)
HBA1C MFR BLD: 6.5 % (ref 4.5–5.7)
Lab: ABNORMAL
Lab: ABNORMAL

## 2025-06-04 PROCEDURE — 99214 OFFICE O/P EST MOD 30 MIN: CPT | Performed by: PHYSICIAN ASSISTANT

## 2025-06-04 PROCEDURE — 82947 ASSAY GLUCOSE BLOOD QUANT: CPT | Performed by: PHYSICIAN ASSISTANT

## 2025-06-04 RX ORDER — LANCETS
EACH MISCELLANEOUS
Qty: 100 EACH | Refills: 5 | Status: SHIPPED | OUTPATIENT
Start: 2025-06-04

## 2025-06-04 RX ORDER — DAPAGLIFLOZIN 5 MG/1
5 TABLET, FILM COATED ORAL DAILY
Qty: 90 TABLET | Refills: 1 | Status: SHIPPED | OUTPATIENT
Start: 2025-06-04

## 2025-06-04 RX ORDER — ATORVASTATIN CALCIUM 80 MG/1
80 TABLET, FILM COATED ORAL NIGHTLY
Qty: 90 TABLET | Refills: 1 | Status: SHIPPED | OUTPATIENT
Start: 2025-06-04

## 2025-06-04 RX ORDER — TIRZEPATIDE 2.5 MG/.5ML
2.5 INJECTION, SOLUTION SUBCUTANEOUS WEEKLY
Qty: 2 ML | Refills: 5 | Status: SHIPPED | OUTPATIENT
Start: 2025-06-04

## 2025-06-04 RX ORDER — METFORMIN HYDROCHLORIDE 500 MG/1
1000 TABLET, EXTENDED RELEASE ORAL EVERY 12 HOURS SCHEDULED
Qty: 360 TABLET | Refills: 2 | Status: SHIPPED | OUTPATIENT
Start: 2025-06-04

## 2025-06-04 RX ORDER — GLIPIZIDE 2.5 MG/1
2.5 TABLET ORAL
Qty: 180 TABLET | Refills: 1 | Status: SHIPPED | OUTPATIENT
Start: 2025-06-04

## 2025-06-04 NOTE — PROGRESS NOTES
Chief Complaint   Patient presents with    Diabetes        HPI  Vito Salvador is a 54 y.o. male presents today for follow-up evaluation of diabetes. They were last seen for this 12/4/2024.     Hemoglobin A1C   Date Value Ref Range Status   06/04/2025 6.5 (A) 4.5 - 5.7 % Final       - BG at home: normal    Taking medications as prescribed and tolerating well.  Has noticed continued gradual weight loss, admits some muscle loss.  Denies changes in appetite.     Admits chronic SOB attributes to hx tobacco use, denies chest pain.     Denies N/V, appetite loss, constipation, abdominal pain.  Denies increased thirst or urination.   Denies vision changes.   Denies numbness, open sores.      Type 2 diabetes:   Diagnosed with diabetes: 2003, previously well controlled   Complications/Co-morbidities: CAD s/p PCI (3/2021), HTN, HLD     Current regimen includes: Mounjaro 2.5 mg weekly, Farxiga 10 mg, Metformin 500 mg ER 2 tablets twice a day, glipizide 2.5 mg twice daily   Previous medications: Januvia (stopped 4/2024, non-efficacious)    Compliance with medications is good, tolerating well.   - taking losartan   - taking atorvastatin  - Aspirin: taking     Denies history of pancreatitis, alcohol abuse. Admits family history of diabetes: father, mother. Denies family hx thyroid disease, MENs.      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    Past Medical History:   Diagnosis Date    CAD S/P percutaneous coronary angioplasty     Hyperlipidemia     Hypertension     Type 2 diabetes mellitus      History reviewed. No pertinent surgical history.   Family History   Problem Relation Age of Onset    Diabetes Mother     Diabetes Father       Social History     Socioeconomic History    Marital status:    Tobacco Use    Smoking status: Every Day     Current packs/day: 1.00     Average packs/day: 1 pack/day for 30.9 years (30.9 ttl pk-yrs)     Types: Cigarettes     Start date: 7/3/1994    Smokeless tobacco: Never   Vaping Use    Vaping status: Never Used   Substance and Sexual Activity    Alcohol use: Defer    Drug use: Never    Sexual activity: Defer      Allergies   Allergen Reactions    Penicillins Rash      Current Outpatient Medications on File Prior to Visit   Medication Sig Dispense Refill    albuterol sulfate  (90 Base) MCG/ACT inhaler Inhale 2 puffs 4 (Four) Times a Day As Needed.      Brilinta 90 MG tablet tablet Take 1 tablet by mouth Every 12 (Twelve) Hours.      carvedilol (COREG) 3.125 MG tablet Take 1 tablet by mouth 2 (Two) Times a Day With Meals.      losartan (COZAAR) 50 MG tablet Take 1 tablet by mouth Daily.      [DISCONTINUED] Accu-Chek Guide test strip 1 each by Other route Daily. 100 each 5    [DISCONTINUED] Accu-Chek Softclix Lancets lancets USE TWICE DAILY TO TEST BLOOD SUGAR      [DISCONTINUED] atorvastatin (LIPITOR) 80 MG tablet TAKE 1 TABLET BY MOUTH EVERY NIGHT 90 tablet 1    [DISCONTINUED] dapagliflozin Propanediol (Farxiga) 10 MG tablet Take 10 mg by mouth Daily. 90 tablet 1    [DISCONTINUED] glipizide 2.5 MG tablet Take 2.5 mg by mouth 2 (Two) Times a Day Before Meals. As needed if blood sugar is more than 140 before dinner, more than 180 fasting in morning. 180 tablet 1    [DISCONTINUED] metFORMIN ER (GLUCOPHAGE-XR) 500 MG 24  "hr tablet Take 2 tablets by mouth Every 12 (Twelve) Hours. 360 tablet 2    [DISCONTINUED] Tirzepatide (Mounjaro) 2.5 MG/0.5ML solution auto-injector Inject 2.5 mg under the skin into the appropriate area as directed 1 (One) Time Per Week. 2 mL 5     No current facility-administered medications on file prior to visit.        Review of Systems   Constitutional:  Positive for unexpected weight loss. Negative for activity change, appetite change and unexpected weight gain.   Eyes:  Negative for visual disturbance.   Respiratory:  Negative for shortness of breath.    Cardiovascular:  Negative for chest pain.   Gastrointestinal:  Negative for abdominal pain, constipation and diarrhea.   Endocrine: Negative for polydipsia and polyuria.   Skin:  Negative for rash and skin lesions.   Neurological:  Negative for dizziness and numbness.        /74 (BP Location: Right arm, Patient Position: Sitting, Cuff Size: Adult)   Pulse 96   Ht 175.3 cm (69\")   Wt 65.2 kg (143 lb 12.8 oz)   SpO2 100%   BMI 21.24 kg/m²      Physical Exam  Constitutional:       Appearance: Normal appearance.   Cardiovascular:      Rate and Rhythm: Normal rate and regular rhythm.      Pulses:           Dorsalis pedis pulses are 2+ on the right side and 2+ on the left side.        Posterior tibial pulses are 2+ on the right side and 2+ on the left side.   Pulmonary:      Effort: Pulmonary effort is normal.   Musculoskeletal:         General: Normal range of motion.      Right foot: No deformity.      Left foot: No deformity.   Feet:      Right foot:      Protective Sensation: 5 sites tested.  5 sites sensed.      Skin integrity: Dry skin present. No ulcer.      Toenail Condition: Right toenails are long.      Left foot:      Protective Sensation: 5 sites tested.  5 sites sensed.      Skin integrity: Dry skin present. No ulcer.      Toenail Condition: Left toenails are long.      Comments: Diabetic Foot Exam Performed and Monofilament Test " "Performed     Skin:     General: Skin is warm and dry.   Neurological:      General: No focal deficit present.      Mental Status: He is alert and oriented to person, place, and time.   Psychiatric:         Mood and Affect: Mood normal.         Behavior: Behavior normal.         LABS AND IMAGING  CMP:  Lab Results   Component Value Date    Glucose 140 (A) 06/04/2025    BUN 10 04/22/2024    BUN/Creatinine Ratio 12 04/22/2024    Creatinine 0.84 04/22/2024    Creatinine, Urine 74.9 04/22/2024    EGFR Result 104 04/22/2024    Total CO2 21 04/22/2024    Calcium 9.2 04/22/2024    Albumin 4.6 04/22/2024    AST (SGOT) 18 04/22/2024    ALT (SGPT) 16 04/22/2024     Lipid Panel:  Lab Results   Component Value Date    TRIG 132 04/22/2024    HDL 34 (L) 04/22/2024    VLDL 24 04/22/2024     (H) 04/22/2024     HbA1c:  Hemoglobin A1C   Date Value Ref Range Status   06/04/2025 6.5 (A) 4.5 - 5.7 % Final     Glucose:  Lab Results   Component Value Date    POCGLU 140 (A) 06/04/2025     Microalbumin:  Lab Results   Component Value Date    MALBCRERATIO 4 04/22/2024     TSH:  No results found for: \"TSH\"    Assessment and Plan    Diagnoses and all orders for this visit:    1. Type 2 diabetes mellitus with complications (Primary)  Assessment & Plan:  Diabetes is controlled.   A1C 6.5%     Discussed switching from Mounjaro to alternative GLP1a due to concerns for weight loss; he would like to continue for now given CAD.  Discussed decrease farxiga may help; interested in going back to 5 mg dose.   Rx: Decrease farxiga 5 mg. Continue glipizide 2.5 mg twice daily, Mounjaro 2.5 mg, metformin 1000 mg ER BID.   Cautioned risk for hypoglycemia with glipizide use; advised glucose supplement as needed.     Due for eye exam - encouraged scheduling.  Microalbumin ordered  Foot exam today   LDL slightly elevated, continue atorvastatin 80 mg for now; consider addition Zetia if continues elevated  BP at goal, continue management with " PCP/cardiology    Orders:  -     POC Glycosylated Hemoglobin (Hb A1C)  -     POC Glucose, Blood  -     glucose blood (Accu-Chek Guide) test strip; 1 each by Other route Daily.  Dispense: 100 each; Refill: 5  -     glipiZIDE 2.5 MG tablet; Take 2.5 mg by mouth 2 (Two) Times a Day Before Meals. As needed if blood sugar is more than 140 before dinner, more than 180 fasting in morning.  Dispense: 180 tablet; Refill: 1  -     metFORMIN ER (GLUCOPHAGE-XR) 500 MG 24 hr tablet; Take 2 tablets by mouth Every 12 (Twelve) Hours.  Dispense: 360 tablet; Refill: 2  -     Comprehensive Metabolic Panel; Future  -     Microalbumin / Creatinine Urine Ratio - Urine, Clean Catch; Future  -     Lipid Panel; Future  -     TSH; Future    2. Tobacco use    3. Mixed hyperlipidemia    Other orders  -     Accu-Chek Softclix Lancets lancets; Use as instructed  Dispense: 100 each; Refill: 5  -     dapagliflozin (FARXIGA) 5 MG tablet tablet; Take 1 tablet by mouth Daily.  Dispense: 90 tablet; Refill: 1  -     Tirzepatide (Mounjaro) 2.5 MG/0.5ML solution auto-injector; Inject 2.5 mg under the skin into the appropriate area as directed 1 (One) Time Per Week.  Dispense: 2 mL; Refill: 5  -     atorvastatin (LIPITOR) 80 MG tablet; Take 1 tablet by mouth Every Night.  Dispense: 90 tablet; Refill: 1         Return in about 6 months (around 12/4/2025) for follow-up diabetes. The patient was instructed to contact the clinic with any interval questions or concerns.    Electronically signed by: Rhonda Leach PA-C   Endocrinology     Please note that portions of this note were completed with a voice recognition program.

## 2025-06-04 NOTE — ASSESSMENT & PLAN NOTE
Diabetes is controlled.   A1C 6.5%     Discussed switching from Mounjaro to alternative GLP1a due to concerns for weight loss; he would like to continue for now given CAD.  Discussed decrease farxiga may help; interested in going back to 5 mg dose.   Rx: Decrease farxiga 5 mg. Continue glipizide 2.5 mg twice daily, Mounjaro 2.5 mg, metformin 1000 mg ER BID.   Cautioned risk for hypoglycemia with glipizide use; advised glucose supplement as needed.     Due for eye exam - encouraged scheduling.  Microalbumin ordered  Foot exam today   LDL slightly elevated, continue atorvastatin 80 mg for now; consider addition Zetia if continues elevated  BP at goal, continue management with PCP/cardiology

## 2025-06-05 LAB
ALBUMIN SERPL-MCNC: 4.6 G/DL (ref 3.8–4.9)
ALBUMIN/CREAT UR: <7 MG/G CREAT (ref 0–29)
ALP SERPL-CCNC: 69 IU/L (ref 44–121)
ALT SERPL-CCNC: 16 IU/L (ref 0–44)
AMBIG ABBREV CMP14 DEFAULT: NORMAL
AST SERPL-CCNC: 15 IU/L (ref 0–40)
BILIRUB SERPL-MCNC: 0.4 MG/DL (ref 0–1.2)
BUN SERPL-MCNC: 14 MG/DL (ref 6–24)
BUN/CREAT SERPL: 16 (ref 9–20)
CALCIUM SERPL-MCNC: 9.6 MG/DL (ref 8.7–10.2)
CHLORIDE SERPL-SCNC: 103 MMOL/L (ref 96–106)
CHOLEST SERPL-MCNC: 104 MG/DL (ref 100–199)
CO2 SERPL-SCNC: 20 MMOL/L (ref 20–29)
CREAT SERPL-MCNC: 0.85 MG/DL (ref 0.76–1.27)
CREAT UR-MCNC: 45.4 MG/DL
EGFRCR SERPLBLD CKD-EPI 2021: 103 ML/MIN/1.73
GLOBULIN SER CALC-MCNC: 2 G/DL (ref 1.5–4.5)
GLUCOSE SERPL-MCNC: 161 MG/DL (ref 70–99)
HDLC SERPL-MCNC: 39 MG/DL
LDLC SERPL CALC-MCNC: 50 MG/DL (ref 0–99)
MICROALBUMIN UR-MCNC: <3 UG/ML
POTASSIUM SERPL-SCNC: 4.2 MMOL/L (ref 3.5–5.2)
PROT SERPL-MCNC: 6.6 G/DL (ref 6–8.5)
SODIUM SERPL-SCNC: 138 MMOL/L (ref 134–144)
TRIGL SERPL-MCNC: 70 MG/DL (ref 0–149)
TSH SERPL DL<=0.005 MIU/L-ACNC: 0.61 UIU/ML (ref 0.45–4.5)
VLDLC SERPL CALC-MCNC: 15 MG/DL (ref 5–40)

## 2025-06-09 ENCOUNTER — RESULTS FOLLOW-UP (OUTPATIENT)
Dept: ENDOCRINOLOGY | Facility: CLINIC | Age: 55
End: 2025-06-09
Payer: COMMERCIAL

## 2025-07-28 RX ORDER — DAPAGLIFLOZIN 10 MG/1
1 TABLET, FILM COATED ORAL DAILY
Qty: 90 TABLET | Refills: 1 | OUTPATIENT
Start: 2025-07-28